# Patient Record
Sex: MALE | URBAN - METROPOLITAN AREA
[De-identification: names, ages, dates, MRNs, and addresses within clinical notes are randomized per-mention and may not be internally consistent; named-entity substitution may affect disease eponyms.]

---

## 2019-03-20 ENCOUNTER — IMPORTED ENCOUNTER (OUTPATIENT)
Dept: URBAN - METROPOLITAN AREA CLINIC 1 | Facility: CLINIC | Age: 64
End: 2019-03-20

## 2019-03-20 PROBLEM — E11.9: Noted: 2019-03-20

## 2019-03-20 PROBLEM — H18.413: Noted: 2019-03-20

## 2019-03-20 PROBLEM — H25.813: Noted: 2019-03-20

## 2019-03-20 PROBLEM — H43.393: Noted: 2019-03-20

## 2019-03-20 PROBLEM — Z79.84: Noted: 2019-03-20

## 2019-03-20 PROCEDURE — 92004 COMPRE OPH EXAM NEW PT 1/>: CPT

## 2019-03-20 PROCEDURE — 92015 DETERMINE REFRACTIVE STATE: CPT

## 2019-03-20 NOTE — PATIENT DISCUSSION
1.  DM Type II (Oral Med) -- without sign of diabetic retinopathy and no blot heme on dilated retinal examination today OU No Macular Edema. Discussed the pathophysiology of diabetes and its effect on the eye and risk of blindness. Stressed the importance of strong glucose control. Advised of importance of at least yearly dilated examinations but to contact us immediately for any problems or concerns. 2. Cataract OU -- Observe for now without intervention. The patient was advised to contact us if any change or worsening of vision3. Vitreous Floaters OU -- Observe. RD Precautions given. 4..  Arcus OU -- Observe. Finalized Glasses MRx today. Letter to PCP. Return for an appointment in 1 year for a 30 with Dr. Bassem Diego.

## 2020-09-02 ENCOUNTER — IMPORTED ENCOUNTER (OUTPATIENT)
Dept: URBAN - METROPOLITAN AREA CLINIC 1 | Facility: CLINIC | Age: 65
End: 2020-09-02

## 2020-09-02 PROBLEM — H25.813: Noted: 2020-09-02

## 2020-09-02 PROBLEM — Z79.84: Noted: 2020-09-02

## 2020-09-02 PROBLEM — E11.9: Noted: 2020-09-02

## 2020-09-02 PROCEDURE — 92015 DETERMINE REFRACTIVE STATE: CPT

## 2020-09-02 PROCEDURE — 92014 COMPRE OPH EXAM EST PT 1/>: CPT

## 2020-09-02 NOTE — PATIENT DISCUSSION
1.  DM Type II (Oral Med) -- without sign of diabetic retinopathy and no blot heme on dilated retinal examination today OU No Macular Edema. Discussed the pathophysiology of diabetes and its effect on the eye and risk of blindness. Stressed the importance of strong glucose control. Advised of importance of at least yearly dilated examinations but to contact us immediately for any problems or concerns. 2. Cataract OU -- Observe for now without intervention. The patient was advised to contact us if any change or worsening of vision3. Vitreous Floaters OU -- Observe. RD Precautions given. 4..  Arcus OU -- Observe. MRX for glasses given. Return for an appointment in 1 year 27 with Dr. Ti Rivera.

## 2021-09-02 ENCOUNTER — IMPORTED ENCOUNTER (OUTPATIENT)
Dept: URBAN - METROPOLITAN AREA CLINIC 1 | Facility: CLINIC | Age: 66
End: 2021-09-02

## 2021-09-02 PROBLEM — H02.831: Noted: 2021-09-02

## 2021-09-02 PROBLEM — H02.834: Noted: 2021-09-02

## 2021-09-02 PROBLEM — Z79.84: Noted: 2021-09-02

## 2021-09-02 PROBLEM — H25.813: Noted: 2021-09-02

## 2021-09-02 PROBLEM — E11.9: Noted: 2021-09-02

## 2021-09-02 PROCEDURE — 99214 OFFICE O/P EST MOD 30 MIN: CPT

## 2021-09-02 PROCEDURE — 92015 DETERMINE REFRACTIVE STATE: CPT

## 2021-09-02 NOTE — PATIENT DISCUSSION
1.  DM Type II (Oral Med) -- without sign of diabetic retinopathy and no blot heme on dilated retinal examination today OU No Macular Edema. Last A1C  Discussed the pathophysiology of diabetes and its effect on the eye and risk of blindness. Stressed the importance of strong glucose control. Advised of importance of at least yearly dilated examinations but to contact us immediately for any problems or concerns. 2. Cataract OU -- Observe for now without intervention. The patient was advised to contact us if any change or worsening of vision. 3. Dermatochalasis OU UL's  - Follow with no intervention at this time. 4. Vitreous Floaters OU -- Old stable. 5. Arcus OU Letter to PCP. MRX for glasses given. Return for an appointment in 1 year 27 with Dr. Bassem Diego.

## 2022-01-12 NOTE — PATIENT DISCUSSION
Clarification.  During visit, he said he was only giving her 10 meq daily.    Please have him continue giving her 10 meq or 1/2 tablet twice a day.  Thank you.   Recommend continued MONITORING for glaucoma.

## 2022-03-18 PROBLEM — R80.9 MICROALBUMINURIA DUE TO TYPE 2 DIABETES MELLITUS (HCC): Status: ACTIVE | Noted: 2020-01-22

## 2022-03-18 PROBLEM — H25.13 NUCLEAR SCLEROTIC CATARACT, BILATERAL: Status: ACTIVE | Noted: 2017-03-30

## 2022-03-18 PROBLEM — E11.29 MICROALBUMINURIA DUE TO TYPE 2 DIABETES MELLITUS (HCC): Status: ACTIVE | Noted: 2020-01-22

## 2022-03-19 PROBLEM — M19.90 OSTEOARTHROSIS: Status: ACTIVE | Noted: 2021-07-21

## 2022-03-19 PROBLEM — N45.1 RIGHT EPIDIDYMITIS: Status: ACTIVE | Noted: 2018-08-09

## 2022-03-19 PROBLEM — M17.0 PRIMARY OSTEOARTHRITIS OF BOTH KNEES: Status: ACTIVE | Noted: 2021-06-15

## 2022-03-19 PROBLEM — Z99.89 OBSTRUCTIVE SLEEP APNEA ON CPAP: Status: ACTIVE | Noted: 2018-01-02

## 2022-03-19 PROBLEM — M25.561 CHRONIC PAIN OF RIGHT KNEE: Status: ACTIVE | Noted: 2021-06-15

## 2022-03-19 PROBLEM — G89.29 CHRONIC PAIN OF RIGHT KNEE: Status: ACTIVE | Noted: 2021-06-15

## 2022-03-19 PROBLEM — G47.33 OBSTRUCTIVE SLEEP APNEA ON CPAP: Status: ACTIVE | Noted: 2018-01-02

## 2022-03-19 PROBLEM — M17.11 PRIMARY OSTEOARTHRITIS OF RIGHT KNEE: Status: ACTIVE | Noted: 2021-07-05

## 2022-04-02 ASSESSMENT — VISUAL ACUITY
OS_CC: J2
OD_SC: 20/25-2
OD_CC: J3
OD_GLARE: 20/150
OS_GLARE: 20/150
OD_SC: 20/20
OD_CC: J2
OD_SC: 20/25
OD_GLARE: 20/150
OS_SC: 20/25
OS_SC: 20/20
OS_GLARE: 20/150
OS_CC: J3
OS_SC: 20/40

## 2022-04-02 ASSESSMENT — KERATOMETRY
OD_AXISANGLE_DEGREES: 003
OS_K1POWER_DIOPTERS: 41.50
OD_K2POWER_DIOPTERS: 39.25
OS_AXISANGLE2_DEGREES: 083
OD_AXISANGLE2_DEGREES: 093
OD_K1POWER_DIOPTERS: 40.50
OS_K2POWER_DIOPTERS: 39.75
OS_AXISANGLE_DEGREES: 173

## 2022-04-02 ASSESSMENT — TONOMETRY
OS_IOP_MMHG: 13
OD_IOP_MMHG: 16
OS_IOP_MMHG: 15
OD_IOP_MMHG: 14
OS_IOP_MMHG: 13
OD_IOP_MMHG: 13

## 2022-04-14 NOTE — PATIENT DISCUSSION
IOP stable today OU. Advised C/D appears to be suspicious for POAG . Recommend further evaluation with DARLENE.

## 2022-09-06 ENCOUNTER — COMPREHENSIVE EXAM (OUTPATIENT)
Dept: URBAN - METROPOLITAN AREA CLINIC 1 | Facility: CLINIC | Age: 67
End: 2022-09-06

## 2022-09-06 DIAGNOSIS — H02.831: ICD-10-CM

## 2022-09-06 DIAGNOSIS — H02.834: ICD-10-CM

## 2022-09-06 DIAGNOSIS — H43.813: ICD-10-CM

## 2022-09-06 DIAGNOSIS — H25.813: ICD-10-CM

## 2022-09-06 DIAGNOSIS — E11.9: ICD-10-CM

## 2022-09-06 PROCEDURE — 92015 DETERMINE REFRACTIVE STATE: CPT

## 2022-09-06 PROCEDURE — 99214 OFFICE O/P EST MOD 30 MIN: CPT

## 2022-09-06 ASSESSMENT — VISUAL ACUITY
OD_CC: 20/20
OD_CC: J1+
OD_BAT: 20/60
OS_CC: 20/25
OS_CC: J1+
OS_BAT: 20/80

## 2022-09-06 ASSESSMENT — TONOMETRY
OD_IOP_MMHG: 15
OS_IOP_MMHG: 15

## 2022-09-06 NOTE — PATIENT DISCUSSION
Observe for now without intervention. The patient was advised to contact office with any change or worsening of vision. New glasses RX given to patient today.

## 2022-09-25 PROBLEM — Z20.822 SHORTNESS OF BREATH WITH EXPOSURE TO COVID-19 VIRUS: Status: ACTIVE | Noted: 2022-09-25

## 2022-09-25 PROBLEM — R07.9 ACUTE CHEST PAIN: Status: ACTIVE | Noted: 2022-09-25

## 2022-09-25 PROBLEM — R06.02 SHORTNESS OF BREATH WITH EXPOSURE TO COVID-19 VIRUS: Status: ACTIVE | Noted: 2022-09-25

## 2022-09-25 PROBLEM — R94.31 ST SEGMENT DEPRESSION: Status: ACTIVE | Noted: 2022-09-25

## 2023-01-31 RX ORDER — SITAGLIPTIN AND METFORMIN HYDROCHLORIDE 500; 50 MG/1; MG/1
TABLET, FILM COATED ORAL
COMMUNITY
Start: 2018-01-02

## 2023-01-31 RX ORDER — AMLODIPINE BESYLATE AND ATORVASTATIN CALCIUM 5; 40 MG/1; MG/1
1 TABLET, FILM COATED ORAL EVERY EVENING
COMMUNITY
Start: 2017-12-15

## 2023-01-31 RX ORDER — ERGOCALCIFEROL 1.25 MG/1
50000 CAPSULE ORAL
COMMUNITY
Start: 2022-10-18

## 2023-01-31 RX ORDER — FERROUS SULFATE 325(65) MG
65 TABLET ORAL
COMMUNITY

## 2023-01-31 RX ORDER — TAMSULOSIN HYDROCHLORIDE 0.4 MG/1
0.4 CAPSULE ORAL
COMMUNITY
Start: 2022-08-05 | End: 2023-02-20

## 2023-09-07 ENCOUNTER — COMPREHENSIVE EXAM (OUTPATIENT)
Dept: URBAN - METROPOLITAN AREA CLINIC 1 | Facility: CLINIC | Age: 68
End: 2023-09-07

## 2023-09-07 DIAGNOSIS — E11.9: ICD-10-CM

## 2023-09-07 PROCEDURE — 92014 COMPRE OPH EXAM EST PT 1/>: CPT

## 2023-09-07 ASSESSMENT — VISUAL ACUITY
OS_CC: J1
OD_BAT: 20/60
OD_CC: J1
OS_BAT: 20/80
OD_CC: 20/20-2
OS_CC: 20/25

## 2023-09-07 ASSESSMENT — TONOMETRY
OD_IOP_MMHG: 12
OS_IOP_MMHG: 12

## 2024-09-04 ENCOUNTER — COMPREHENSIVE EXAM (OUTPATIENT)
Dept: URBAN - METROPOLITAN AREA CLINIC 1 | Facility: CLINIC | Age: 69
End: 2024-09-04

## 2024-09-04 DIAGNOSIS — H43.813: ICD-10-CM

## 2024-09-04 DIAGNOSIS — H25.813: ICD-10-CM

## 2024-09-04 DIAGNOSIS — H02.831: ICD-10-CM

## 2024-09-04 DIAGNOSIS — E11.9: ICD-10-CM

## 2024-09-04 DIAGNOSIS — H02.834: ICD-10-CM

## 2024-09-04 PROCEDURE — 92015 DETERMINE REFRACTIVE STATE: CPT

## 2024-09-04 PROCEDURE — 99214 OFFICE O/P EST MOD 30 MIN: CPT
